# Patient Record
Sex: MALE | Race: WHITE
[De-identification: names, ages, dates, MRNs, and addresses within clinical notes are randomized per-mention and may not be internally consistent; named-entity substitution may affect disease eponyms.]

---

## 2020-05-03 ENCOUNTER — HOSPITAL ENCOUNTER (EMERGENCY)
Dept: HOSPITAL 63 - ER | Age: 55
Discharge: HOME | End: 2020-05-03
Payer: MEDICAID

## 2020-05-03 VITALS — BODY MASS INDEX: 25.31 KG/M2 | WEIGHT: 180.78 LBS | HEIGHT: 71 IN

## 2020-05-03 DIAGNOSIS — K22.2: Primary | ICD-10-CM

## 2020-05-03 DIAGNOSIS — R07.89: ICD-10-CM

## 2020-05-03 PROCEDURE — 96374 THER/PROPH/DIAG INJ IV PUSH: CPT

## 2020-05-03 PROCEDURE — 99283 EMERGENCY DEPT VISIT LOW MDM: CPT

## 2020-05-03 NOTE — PHYS DOC
Past History


Additional Past Medical Histor:  MS


Past Surgical History:  No Surgical History


Smoking:  Non-smoker


Alcohol Use:  None


Drug Use:  None





General Adult


EDM:


Chief Complaint:  DIFFICULTY SWALLOWING





HPI:


HPI:





55-year-old male presents with report of sensation that food bolus is stuck in 

his esophagus. Patient does have past medical history of MS. Reports history of 

prior food bolus issues that required GI intervention. Reports he does not 

follow closely with GI. Reports he has had some problems with acid reflux but is

currently not being treated with any medication. Reports was at local [employer] 

and eating a hamburger.  Reports tried to get food bolus to pass with drinking 

liquid however liquid would "immediately come right back up ".





Review of Systems:


Review of Systems:





Constitutional: Denies fever or chills 


Eyes: Denies redness or eye pain 


HENT: Denies nasal congestion or sore throat


Respiratory: Denies cough or shortness of breath 


Cardiovascular: Reports chest discomfort; denies palpitation


GI: Denies abdominal pain, nausea, or vomiting


: Denies dysuria or hematuria


Musculoskeletal: Denies back pain or joint pain


Integument: Denies rash or skin lesions 


Neurologic: Denies headache, focal weakness or sensory changes





Complete systems were reviewed and found to be within normal limits, except as 

documented in this note.





Current Medications:


Current Meds:





Current Medications








 Medications


  (Trade)  Dose


 Ordered  Sig/Rudy  Start Time


 Stop Time Status Last Admin


Dose Admin


 


 Glucagon


  (Glucagen Kit)  1 mg  1X  ONCE  5/3/20 17:00


 5/3/20 17:07 DC 5/3/20 17:09


1 MG











Allergies:


Allergies:





Allergies








Coded Allergies Type Severity Reaction Last Updated Verified


 


  No Known Drug Allergies    5/3/20 No











Physical Exam:


PE:





Constitutional: Well developed, well nourished, no acute distress, non-toxic 

appearance


HENT: Normocephalic, atraumatic, oropharynx moist


Eyes: Conjunctiva normal, no discharge


Neck: Normal range of motion, no tenderness, supple, no stridor


Cardiovascular: Heart rate normal, regular rhythm


Lungs & Thorax:  Bilateral breath sounds clear to auscultation, no wheezing


Abdomen: Soft, no tenderness, no guarding/rebound tenderness/distention


Skin: Warm, dry, no erythema, no rash


Extremities: No tenderness, ROM intact, no edema


Neurologic: Alert and oriented X 3, speech normal


Psychologic: Affect normal, judgment normal





EKG:


EKG:


[]





Radiology/Procedures:


Radiology/Procedures:


[]





Course & Med Decision Making:


Course & Med Decision Making





Patient presents with history of present illness and physical exam concerning 

for esophageal food bolus. Reports history of prior food bolus requiring 

intervention with GI lab. Ports had been eating a hamburger at the local 

[email or web address]. Doubt that patient swallowed any bone. Patient also with history of MS.





Glucagon provided. Trialed Sprite after 30 mins with interval resolution of 

symptoms.  Patient able to drink Sprite without issue.





Patient stable for discharge with outpatient follow-up with PCP/GI. GI referral 

provided. Discussed findings and plan with patient, who acknowledges 

understanding and agreement.





Dragon Disclaimer:


Dragon Disclaimer:


This electronic medical record was generated, in whole or in part, using a voice

 recognition dictation system.





Departure


Departure:


Impression:  


   Primary Impression:  


   Esophageal obstruction due to food impaction


Disposition:  01 HOME, SELF-CARE


Condition:  IMPROVED


Referrals:  


PCP,RODRICK (PCP)








PAMELA FITZGERALD MD


Patient Instructions:  Diet - Soft, Esophageal Stricture, Gastroesophageal 

Reflux Disease, Adult, Easy-to-Read


Scripts


Famotidine (PEPCID) 20 Mg Tablet


1 TAB PO BID for GERD, #30 TAB


   Prov: CHERYLE KIRK DO         5/3/20











CHERYLE KIRK DO              May 3, 2020 17:25